# Patient Record
Sex: FEMALE | Race: WHITE | NOT HISPANIC OR LATINO
[De-identification: names, ages, dates, MRNs, and addresses within clinical notes are randomized per-mention and may not be internally consistent; named-entity substitution may affect disease eponyms.]

---

## 2017-03-22 ENCOUNTER — APPOINTMENT (OUTPATIENT)
Dept: FAMILY MEDICINE | Facility: CLINIC | Age: 67
End: 2017-03-22

## 2017-03-22 VITALS
HEIGHT: 62 IN | OXYGEN SATURATION: 97 % | RESPIRATION RATE: 16 BRPM | SYSTOLIC BLOOD PRESSURE: 104 MMHG | HEART RATE: 76 BPM | BODY MASS INDEX: 29.27 KG/M2 | WEIGHT: 159.06 LBS | DIASTOLIC BLOOD PRESSURE: 62 MMHG

## 2017-03-23 LAB
ALBUMIN SERPL ELPH-MCNC: 4.3 G/DL
ALP BLD-CCNC: 120 U/L
ALT SERPL-CCNC: 17 U/L
ANION GAP SERPL CALC-SCNC: 14 MMOL/L
AST SERPL-CCNC: 25 U/L
BILIRUB SERPL-MCNC: 0.2 MG/DL
BUN SERPL-MCNC: 14 MG/DL
CALCIUM SERPL-MCNC: 9.8 MG/DL
CHLORIDE SERPL-SCNC: 99 MMOL/L
CHOLEST SERPL-MCNC: 240 MG/DL
CHOLEST/HDLC SERPL: 2.6 RATIO
CO2 SERPL-SCNC: 27 MMOL/L
CREAT SERPL-MCNC: 0.83 MG/DL
GLUCOSE SERPL-MCNC: 82 MG/DL
HBA1C MFR BLD HPLC: 5.8 %
HDLC SERPL-MCNC: 91 MG/DL
LDLC SERPL CALC-MCNC: 121 MG/DL
POTASSIUM SERPL-SCNC: 4.1 MMOL/L
PROT SERPL-MCNC: 8 G/DL
SODIUM SERPL-SCNC: 140 MMOL/L
TRIGL SERPL-MCNC: 142 MG/DL
TSH SERPL-ACNC: 1.19 UIU/ML

## 2017-08-07 ENCOUNTER — RX RENEWAL (OUTPATIENT)
Age: 67
End: 2017-08-07

## 2018-04-23 ENCOUNTER — FORM ENCOUNTER (OUTPATIENT)
Age: 68
End: 2018-04-23

## 2018-04-23 ENCOUNTER — APPOINTMENT (OUTPATIENT)
Dept: FAMILY MEDICINE | Facility: CLINIC | Age: 68
End: 2018-04-23
Payer: COMMERCIAL

## 2018-04-23 VITALS
BODY MASS INDEX: 27.23 KG/M2 | DIASTOLIC BLOOD PRESSURE: 62 MMHG | HEIGHT: 62 IN | WEIGHT: 148 LBS | SYSTOLIC BLOOD PRESSURE: 136 MMHG

## 2018-04-23 VITALS — SYSTOLIC BLOOD PRESSURE: 120 MMHG | DIASTOLIC BLOOD PRESSURE: 76 MMHG

## 2018-04-23 DIAGNOSIS — M11.259: ICD-10-CM

## 2018-04-23 DIAGNOSIS — Z01.818 ENCOUNTER FOR OTHER PREPROCEDURAL EXAMINATION: ICD-10-CM

## 2018-04-23 PROCEDURE — 36415 COLL VENOUS BLD VENIPUNCTURE: CPT

## 2018-04-23 PROCEDURE — 99397 PER PM REEVAL EST PAT 65+ YR: CPT | Mod: 25

## 2018-04-23 PROCEDURE — G0009: CPT

## 2018-04-23 PROCEDURE — 90732 PPSV23 VACC 2 YRS+ SUBQ/IM: CPT

## 2018-04-23 RX ORDER — TOBRAMYCIN AND DEXAMETHASONE 3; 1 MG/ML; MG/ML
0.3-0.1 SUSPENSION/ DROPS OPHTHALMIC
Qty: 5 | Refills: 0 | Status: DISCONTINUED | COMMUNITY
Start: 2017-03-15 | End: 2018-04-23

## 2018-04-24 ENCOUNTER — APPOINTMENT (OUTPATIENT)
Dept: RADIOLOGY | Facility: CLINIC | Age: 68
End: 2018-04-24
Payer: COMMERCIAL

## 2018-04-24 ENCOUNTER — OUTPATIENT (OUTPATIENT)
Dept: OUTPATIENT SERVICES | Facility: HOSPITAL | Age: 68
LOS: 1 days | End: 2018-04-24
Payer: COMMERCIAL

## 2018-04-24 DIAGNOSIS — Z00.8 ENCOUNTER FOR OTHER GENERAL EXAMINATION: ICD-10-CM

## 2018-04-24 LAB
ALBUMIN SERPL ELPH-MCNC: 4.3 G/DL
ALP BLD-CCNC: 100 U/L
ALT SERPL-CCNC: 17 U/L
ANION GAP SERPL CALC-SCNC: 12 MMOL/L
AST SERPL-CCNC: 24 U/L
BILIRUB SERPL-MCNC: 0.4 MG/DL
BUN SERPL-MCNC: 14 MG/DL
CALCIUM SERPL-MCNC: 10.4 MG/DL
CHLORIDE SERPL-SCNC: 100 MMOL/L
CHOLEST SERPL-MCNC: 212 MG/DL
CHOLEST/HDLC SERPL: 2.5 RATIO
CO2 SERPL-SCNC: 29 MMOL/L
CREAT SERPL-MCNC: 0.72 MG/DL
GLUCOSE SERPL-MCNC: 83 MG/DL
HBA1C MFR BLD HPLC: 5.6 %
HDLC SERPL-MCNC: 86 MG/DL
LDLC SERPL CALC-MCNC: 111 MG/DL
POTASSIUM SERPL-SCNC: 5 MMOL/L
PROT SERPL-MCNC: 7.8 G/DL
SODIUM SERPL-SCNC: 141 MMOL/L
TRIGL SERPL-MCNC: 73 MG/DL
TSH SERPL-ACNC: 0.98 UIU/ML

## 2018-04-24 PROCEDURE — 72110 X-RAY EXAM L-2 SPINE 4/>VWS: CPT | Mod: 26

## 2018-04-24 PROCEDURE — 72110 X-RAY EXAM L-2 SPINE 4/>VWS: CPT

## 2018-04-24 PROCEDURE — 72170 X-RAY EXAM OF PELVIS: CPT

## 2018-04-24 PROCEDURE — 73522 X-RAY EXAM HIPS BI 3-4 VIEWS: CPT

## 2018-04-24 PROCEDURE — 73522 X-RAY EXAM HIPS BI 3-4 VIEWS: CPT | Mod: 26

## 2018-04-26 PROBLEM — M11.259: Status: ACTIVE | Noted: 2018-04-26

## 2018-10-25 ENCOUNTER — APPOINTMENT (OUTPATIENT)
Dept: FAMILY MEDICINE | Facility: CLINIC | Age: 68
End: 2018-10-25
Payer: COMMERCIAL

## 2018-10-25 VITALS
OXYGEN SATURATION: 95 % | WEIGHT: 152.25 LBS | HEART RATE: 72 BPM | HEIGHT: 62 IN | DIASTOLIC BLOOD PRESSURE: 70 MMHG | SYSTOLIC BLOOD PRESSURE: 122 MMHG | BODY MASS INDEX: 28.02 KG/M2

## 2018-10-25 PROCEDURE — 99214 OFFICE O/P EST MOD 30 MIN: CPT | Mod: 25

## 2018-10-25 PROCEDURE — G0008: CPT

## 2018-10-25 PROCEDURE — 90662 IIV NO PRSV INCREASED AG IM: CPT

## 2018-10-25 NOTE — ASSESSMENT
[FreeTextEntry1] : Trial of Flexeril for torticollis.\par \par Significance of aortic insufficiency. Discussed followup with cardiology.\par \par Continue antidepressant medications. Patient rarely uses Xanax

## 2018-10-25 NOTE — PHYSICAL EXAM
[No Acute Distress] : no acute distress [Thyroid Normal, No Nodules] : the thyroid was normal and there were no nodules present [Clear to Auscultation] : lungs were clear to auscultation bilaterally [Regular Rhythm] : with a regular rhythm [No Murmur] : no murmur heard [No Carotid Bruits] : no carotid bruits [No Edema] : there was no peripheral edema [de-identified] : No murmur appreciated

## 2018-10-25 NOTE — HISTORY OF PRESENT ILLNESS
[de-identified] : Chronic torticollis. Patient unable to sleep in any position other than on her back and neck pain. No radiculopathy. Will give trial of Flexeril.\par \par Patient has daytime fatigue. She snores, but does not have apnea\par \par Patch of eczema, right antecubital fossa, response to steroid.\par \par Moderate aortic insufficiency. No shortness of breath is followed by cardiology. No chest pain or palpitations\par \par Depression well controlled on current meds

## 2018-10-25 NOTE — REVIEW OF SYSTEMS
[Fatigue] : fatigue [Recent Change In Weight] : ~T no recent weight change [Muscle Pain] : muscle pain [Negative] : Gastrointestinal

## 2019-03-28 ENCOUNTER — EMERGENCY (EMERGENCY)
Facility: HOSPITAL | Age: 69
LOS: 1 days | End: 2019-03-28
Admitting: EMERGENCY MEDICINE
Payer: COMMERCIAL

## 2019-03-28 PROCEDURE — 99283 EMERGENCY DEPT VISIT LOW MDM: CPT

## 2019-03-29 ENCOUNTER — APPOINTMENT (OUTPATIENT)
Dept: FAMILY MEDICINE | Facility: CLINIC | Age: 69
End: 2019-03-29
Payer: COMMERCIAL

## 2019-03-29 VITALS
HEIGHT: 62 IN | DIASTOLIC BLOOD PRESSURE: 72 MMHG | SYSTOLIC BLOOD PRESSURE: 120 MMHG | HEART RATE: 80 BPM | BODY MASS INDEX: 27.97 KG/M2 | WEIGHT: 152 LBS | OXYGEN SATURATION: 97 % | RESPIRATION RATE: 16 BRPM

## 2019-03-29 DIAGNOSIS — M43.6 TORTICOLLIS: ICD-10-CM

## 2019-03-29 PROCEDURE — 99213 OFFICE O/P EST LOW 20 MIN: CPT

## 2019-03-29 NOTE — PLAN
[FreeTextEntry1] : Severe neck pain and radiculopathy, secondary to DJD. Will renew Valium 5 mg patient has appointment with neurosurgery in 3 days obtain a repeat MRI

## 2019-03-29 NOTE — HISTORY OF PRESENT ILLNESS
[FreeTextEntry8] : Severe neck pain with extremely limited range of motion and muscle spasm with some left-sided radiculopathy. No bowel or bladder symptoms. No spasticity of gait seen at a walk-in 2 days ago given Toradol Motrin, Valium and oxycodone, which only provides temporary relief. MRI 4 years ago, showed significant cervical radiculopathy. Patient has appointment with Dr. Gabriel in 3 days.\par \par Physical exam unable to move the neck in any direction. Deep tendon reflexes are 3+ and symmetrical. Upper and lower extremities

## 2019-03-31 ENCOUNTER — RX RENEWAL (OUTPATIENT)
Age: 69
End: 2019-03-31

## 2019-04-01 ENCOUNTER — APPOINTMENT (OUTPATIENT)
Dept: NEUROSURGERY | Facility: CLINIC | Age: 69
End: 2019-04-01
Payer: COMMERCIAL

## 2019-04-01 VITALS
DIASTOLIC BLOOD PRESSURE: 70 MMHG | SYSTOLIC BLOOD PRESSURE: 118 MMHG | HEIGHT: 62 IN | BODY MASS INDEX: 27.6 KG/M2 | WEIGHT: 150 LBS

## 2019-04-01 PROCEDURE — 99204 OFFICE O/P NEW MOD 45 MIN: CPT

## 2019-04-01 NOTE — RESULTS/DATA
[FreeTextEntry1] : MRI ([facility]) was personally reviewed with the patient today. It reveals a slight retrolisthesis at C5-6. There is a broad based disc osteophyte complex at C5-6 resulting in mild to moderate central canal and moderate bilateral foraminal stenosis. At C4-5 there is a broad based disc osteophyte complex without central canal or bilateral foraminal stenosis. At C6-7 there is another broad based disc osteophyte complex without central canal narrowing. \par \par full report within EMR

## 2019-04-01 NOTE — HISTORY OF PRESENT ILLNESS
[> 3 months] : more  than 3 months [FreeTextEntry1] : Neck pain [de-identified] : Mrs. Mcleod is a pleasant 68 year old female who presents to the office in follow-up from a visit to the ED at [facility] [place] on 3/28 for severe neck pain. She has been experiencing neck pain for the past twenty years, but will flare up on occasions. She awoke on Wednesday morning in severe pain. She has been seeing pain management, she last saw someone about 10 years ago for her neck pain. She had trigger point injections in the past with little to no relief. She had also gone to PT multiple times and acupuncture with minimal temporary relief. She will typically take Advil as needed for severe pain with some relief. Her pain has gotten so bad that she presented to the ED on Thursday last week  and was given an injection of Toradol, Valium, Percocet and Ibuprofen. She has been taking that with some relief. She also notes reduced range of motion in her neck. She rates her pain today as 10/10. She denies radicular symptoms, paresthesias and weakness in the arms, but has had that in the past. She is right hand dominant and denies loss of dexterity and  strength.

## 2019-04-01 NOTE — PHYSICAL EXAM
[General Appearance - Alert] : alert [General Appearance - In No Acute Distress] : in no acute distress [General Appearance - Well Nourished] : well nourished [General Appearance - Well Developed] : well developed [General Appearance - Well-Appearing] : healthy appearing [] : normal voice and communication [Person] : oriented to person [Place] : oriented to place [Time] : oriented to time [Cranial Nerves Optic (II)] : visual acuity intact bilaterally,  pupils equal round and reactive to light [Cranial Nerves Oculomotor (III)] : extraocular motion intact [Cranial Nerves Trigeminal (V)] : facial sensation intact symmetrically [Cranial Nerves Facial (VII)] : face symmetrical [Cranial Nerves Vestibulocochlear (VIII)] : hearing was intact bilaterally [Cranial Nerves Glossopharyngeal (IX)] : tongue and palate midline [Cranial Nerves Accessory (XI - Cranial And Spinal)] : head turning and shoulder shrug symmetric [Cranial Nerves Hypoglossal (XII)] : there was no tongue deviation with protrusion [Motor Tone] : muscle tone was normal in all four extremities [Motor Strength] : muscle strength was normal in all four extremities [Involuntary Movements] : no involuntary movements were seen [No Muscle Atrophy] : normal bulk in all four extremities [Motor Handedness Right-Handed] : the patient is right hand dominant [Sensation Tactile Decrease] : light touch was intact [Sensation Vibration Decrease] : vibration was intact [Proprioception] : proprioception was intact [Abnormal Walk] : normal gait [Balance] : balance was intact [2+] : Brachioradialis left 2+ [No Visual Abnormalities] : no visible abnormalities [Normal Lordosis] : normal lordosis [No Scoliosis] : no scoliosis [No Masses] : no masses [Left  Paraspinal ___ (level)] : ~Ulevel [unfilled] left paraspinal [Right Paraspinal ___ (level)] : ~Ulevel [unfilled] right paraspinal [Muscle Spasms, Bilateral] : bilateral muscle spasms [Full Except as Noted] : Full except as noted: [Restricted] : was restricted [Pain] : was painful [Normal] : normal [4] : 4/5 Wrist Extension (C6) [Intact] : all reflexes within normal limits bilaterally [FreeTextEntry1] : appears uncomfortable [FreeTextEntry6] : +trace weakness with biceps on the left [Spurling's - Opposite Side] : Negative Spurling's on opposite side [Spurling's Same Side] : Negative Spurling's on same side

## 2019-04-01 NOTE — PLAN
[FreeTextEntry1] : DIAGNOSIS:  CERVICAL HERNIATED DISC C5-6\par TREATMENT PLAN: ANTERIOR CERVICAL  DECOMPRESSION AND FUSION vs. NON-SURGICAL TREATMENT\par \par This is a patient who presents to the office with neck pain, and what appears to be mild central canal stenosis at C5-6. Her MRI reveals a disc osteophyte complex resulting in mild to moderate central canal stenosis and moderate bilateral foraminal stenosis. She is currently only complaining of neck pain. She denies any other radicular symptoms, and does not display any symptoms of myelopathy. I recommended she start with the most conservative treatment which includes physical therapy and antiinflammatories. If his symptoms are refractory to the conservative management then I recommended an anterior cervical decompression and fusion as a treatment option.  This entails removing the disk, osteophytes and the ventral uncovertebral joints along with the underlying hypertrophied/ossified posterior longitudinal ligamentum.  This will allow for a widening of the spinal canal and the neuroforamen at the effected levels.  In doing so this will likely result in added instability to the spine at this level requiring the need for instrumented stabilization and fusion.  This implies the use of anterior plate fixation and interbody prosthetics to provide strength and anatomical alignment to the operated segments.  \par \par Risks and benefits of surgery were described to the patient in detail which include but not excluding those otherwise not mentioned:  coma paralysis, death, stroke, spinal fluid leak, nerve injury, weakness, infection, hardware malfunction/failure/malpositioning requiring re-operation, vascular injury, nonunion/pseudoarthrosis, adjacent segment degeneration, dysphonia/dysphagia and persistent pain.\par

## 2019-04-01 NOTE — ASSESSMENT
[FreeTextEntry1] : This is a pleasant 68 year old female who presents to the office today with complaints of axial neck pain. She is currently neurologically intact on exam and does not have any radicular symptoms or signs of myelopathy. Her MRI does reveal a disc osteophyte complex at C5-6 resulting in foraminal and central canal stenosis, however I think we can manage her symptoms conservatively at this time. I've recommended she consider pain management for epidural steroid injections prior to any surgical intervention. I've advised her that if her symptoms do not improve or she does develop radicular symptoms then she may be a candidate for a one level ACDF. We can follow-up with her once she follow-up with pain management.

## 2019-04-01 NOTE — REASON FOR VISIT
[Referred By: _________] : Patient was referred by STEPHANE [FreeTextEntry1] : [facility] ed f/u, neck pain, mri at [facility]

## 2019-04-01 NOTE — CONSULT LETTER
[Dear  ___] : Dear  [unfilled], [Consult Letter:] : I had the pleasure of evaluating your patient, [unfilled]. [Consult Closing:] : Thank you very much for allowing me to participate in the care of this patient.  If you have any questions, please do not hesitate to contact me. [Sincerely,] : Sincerely, [FreeTextEntry1] : Mrs. Mcleod is a pleasant 68 year old female who presents to the office in follow-up from a visit to the ED at [facility] on 3/28 for severe neck pain. She has been experiencing neck pain for the past twenty years, but will flare up on occasions. She awoke on Wednesday morning in severe pain. She has been seeing pain management, she last saw someone about 10 years ago for her neck pain. She had trigger point injections in the past with little to no relief. She had also gone to PT multiple times and acupuncture with minimal temporary relief. She will typically take Advil as needed for severe pain with some relief. Her pain has gotten so bad that she presented to the ED on Thursday last week  and was given an injection of Toradol, Valium, Percocet and Ibuprofen. She has been taking that with some relief. She also notes reduced range of motion in her neck. She rates her pain today as 10/10. She denies radicular symptoms, paresthesias and weakness in the arms, but has had that in the past. She is right hand dominant and denies loss of dexterity and  strength.\par \par \par This is a pleasant 68 year old female who presents to the office today with complaints of axial neck pain. She is currently neurologically intact on exam and does not have any radicular symptoms or signs of myelopathy. Her MRI does reveal a disc osteophyte complex at C5-6 resulting in foraminal and central canal stenosis, however I think we can manage her symptoms conservatively at this time. I've recommended she consider pain management for epidural steroid injections prior to any surgical intervention. I've advised her that if her symptoms do not improve or she does develop radicular symptoms then she may be a candidate for a one level ACDF. We can follow-up with her once she follow-up with pain management. \par \par DIAGNOSIS:  CERVICAL HERNIATED DISC C5-6\par TREATMENT PLAN: ANTERIOR CERVICAL  DECOMPRESSION AND FUSION vs. NON-SURGICAL TREATMENT\par \par This is a patient who presents to the office with neck pain, and what appears to be mild central canal stenosis at C5-6. Her MRI reveals a disc osteophyte complex resulting in mild to moderate central canal stenosis and moderate bilateral foraminal stenosis. She is currently only complaining of neck pain. She denies any other radicular symptoms, and does not display any symptoms of myelopathy. I recommended she start with the most conservative treatment which includes physical therapy and antiinflammatories. If his symptoms are refractory to the conservative management then I recommended an anterior cervical decompression and fusion as a treatment option.  This entails removing the disk, osteophytes and the ventral uncovertebral joints along with the underlying hypertrophied/ossified posterior longitudinal ligamentum.  This will allow for a widening of the spinal canal and the neuroforamen at the effected levels.  In doing so this will likely result in added instability to the spine at this level requiring the need for instrumented stabilization and fusion.  This implies the use of anterior plate fixation and interbody prosthetics to provide strength and anatomical alignment to the operated segments.  \par \par  [FreeTextEntry3] : Krystal Serra R-PAC

## 2019-04-03 ENCOUNTER — APPOINTMENT (OUTPATIENT)
Dept: FAMILY MEDICINE | Facility: CLINIC | Age: 69
End: 2019-04-03

## 2019-04-10 ENCOUNTER — OUTPATIENT (OUTPATIENT)
Dept: OUTPATIENT SERVICES | Facility: HOSPITAL | Age: 69
LOS: 1 days | End: 2019-04-10

## 2019-04-24 ENCOUNTER — OUTPATIENT (OUTPATIENT)
Dept: OUTPATIENT SERVICES | Facility: HOSPITAL | Age: 69
LOS: 1 days | End: 2019-04-24

## 2019-05-08 ENCOUNTER — APPOINTMENT (OUTPATIENT)
Dept: FAMILY MEDICINE | Facility: CLINIC | Age: 69
End: 2019-05-08
Payer: COMMERCIAL

## 2019-05-08 ENCOUNTER — TRANSCRIPTION ENCOUNTER (OUTPATIENT)
Age: 69
End: 2019-05-08

## 2019-05-08 VITALS
DIASTOLIC BLOOD PRESSURE: 70 MMHG | OXYGEN SATURATION: 97 % | WEIGHT: 151.25 LBS | HEIGHT: 62 IN | BODY MASS INDEX: 27.83 KG/M2 | SYSTOLIC BLOOD PRESSURE: 116 MMHG | HEART RATE: 82 BPM

## 2019-05-08 PROCEDURE — 99214 OFFICE O/P EST MOD 30 MIN: CPT

## 2019-05-08 RX ORDER — OXYCODONE AND ACETAMINOPHEN 5; 325 MG/1; MG/1
5-325 TABLET ORAL
Qty: 28 | Refills: 0 | Status: DISCONTINUED | COMMUNITY
Start: 2019-04-01 | End: 2019-05-08

## 2019-05-08 NOTE — HISTORY OF PRESENT ILLNESS
[de-identified] : Neck pain improved after facet blocks C2 to through C4.  Patient now scheduled for ablation\par \par Depression and anxiety well controlled on Paxil\par \par Followed by cardiology moderate aortic insufficiency and mild mitral regurgitation on recent echo cardiologist is Dr. Jeronimo cardiology request lipid profile patient's calculated risk is 5% in the next 10 years\par \par Due for colonoscopy\par \par Nontoxic multinodular goiter followed by endocrinology

## 2019-05-08 NOTE — HEALTH RISK ASSESSMENT
[] : No [No falls in past year] : Patient reported no falls in the past year [0] : 2) Feeling down, depressed, or hopeless: Not at all (0) [de-identified] : Healthy

## 2019-05-08 NOTE — PHYSICAL EXAM
[Clear to Auscultation] : lungs were clear to auscultation bilaterally [No Acute Distress] : no acute distress [Regular Rhythm] : with a regular rhythm [No Edema] : there was no peripheral edema [de-identified] : Decreased range of motion

## 2019-05-09 LAB
25(OH)D3 SERPL-MCNC: 50.4 NG/ML
ALBUMIN SERPL ELPH-MCNC: 4.4 G/DL
ALP BLD-CCNC: 117 U/L
ALT SERPL-CCNC: 22 U/L
ANION GAP SERPL CALC-SCNC: 11 MMOL/L
AST SERPL-CCNC: 23 U/L
BASOPHILS # BLD AUTO: 0.06 K/UL
BASOPHILS NFR BLD AUTO: 1 %
BILIRUB SERPL-MCNC: 0.3 MG/DL
BUN SERPL-MCNC: 14 MG/DL
CALCIUM SERPL-MCNC: 10 MG/DL
CHLORIDE SERPL-SCNC: 102 MMOL/L
CHOLEST SERPL-MCNC: 223 MG/DL
CHOLEST/HDLC SERPL: 2.8 RATIO
CO2 SERPL-SCNC: 28 MMOL/L
CREAT SERPL-MCNC: 0.71 MG/DL
EOSINOPHIL # BLD AUTO: 0.09 K/UL
EOSINOPHIL NFR BLD AUTO: 1.5 %
ESTIMATED AVERAGE GLUCOSE: 117 MG/DL
GLUCOSE SERPL-MCNC: 91 MG/DL
HBA1C MFR BLD HPLC: 5.7 %
HCT VFR BLD CALC: 42.8 %
HDLC SERPL-MCNC: 80 MG/DL
HGB BLD-MCNC: 13.5 G/DL
IMM GRANULOCYTES NFR BLD AUTO: 0.2 %
LDLC SERPL CALC-MCNC: 128 MG/DL
LYMPHOCYTES # BLD AUTO: 1.23 K/UL
LYMPHOCYTES NFR BLD AUTO: 20.1 %
MAN DIFF?: NORMAL
MCHC RBC-ENTMCNC: 30.4 PG
MCHC RBC-ENTMCNC: 31.5 GM/DL
MCV RBC AUTO: 96.4 FL
MONOCYTES # BLD AUTO: 0.35 K/UL
MONOCYTES NFR BLD AUTO: 5.7 %
NEUTROPHILS # BLD AUTO: 4.37 K/UL
NEUTROPHILS NFR BLD AUTO: 71.5 %
PLATELET # BLD AUTO: 288 K/UL
POTASSIUM SERPL-SCNC: 5.2 MMOL/L
PROT SERPL-MCNC: 7 G/DL
RBC # BLD: 4.44 M/UL
RBC # FLD: 13.2 %
SODIUM SERPL-SCNC: 141 MMOL/L
TRIGL SERPL-MCNC: 76 MG/DL
TSH SERPL-ACNC: 1.04 UIU/ML
WBC # FLD AUTO: 6.11 K/UL

## 2019-05-22 ENCOUNTER — OUTPATIENT (OUTPATIENT)
Dept: OUTPATIENT SERVICES | Facility: HOSPITAL | Age: 69
LOS: 1 days | End: 2019-05-22

## 2019-09-04 ENCOUNTER — OUTPATIENT (OUTPATIENT)
Dept: OUTPATIENT SERVICES | Facility: HOSPITAL | Age: 69
LOS: 1 days | End: 2019-09-04

## 2019-11-21 ENCOUNTER — APPOINTMENT (OUTPATIENT)
Dept: FAMILY MEDICINE | Facility: CLINIC | Age: 69
End: 2019-11-21
Payer: COMMERCIAL

## 2019-11-21 VITALS
WEIGHT: 161 LBS | OXYGEN SATURATION: 97 % | HEIGHT: 62 IN | DIASTOLIC BLOOD PRESSURE: 70 MMHG | SYSTOLIC BLOOD PRESSURE: 110 MMHG | HEART RATE: 92 BPM | BODY MASS INDEX: 29.63 KG/M2

## 2019-11-21 DIAGNOSIS — D17.79 BENIGN LIPOMATOUS NEOPLASM OF OTHER SITES: ICD-10-CM

## 2019-11-21 DIAGNOSIS — M50.20 OTHER CERVICAL DISC DISPLACEMENT, UNSPECIFIED CERVICAL REGION: ICD-10-CM

## 2019-11-21 DIAGNOSIS — E04.2 NONTOXIC MULTINODULAR GOITER: ICD-10-CM

## 2019-11-21 DIAGNOSIS — M54.32 SCIATICA, LEFT SIDE: ICD-10-CM

## 2019-11-21 DIAGNOSIS — M19.012 PRIMARY OSTEOARTHRITIS, LEFT SHOULDER: ICD-10-CM

## 2019-11-21 PROCEDURE — 20605 DRAIN/INJ JOINT/BURSA W/O US: CPT

## 2019-11-21 PROCEDURE — 99214 OFFICE O/P EST MOD 30 MIN: CPT | Mod: 25

## 2019-11-21 RX ORDER — MELOXICAM 15 MG/1
15 TABLET ORAL
Qty: 30 | Refills: 3 | Status: DISCONTINUED | COMMUNITY
Start: 2018-04-26 | End: 2019-11-21

## 2019-11-21 RX ORDER — DIAZEPAM 5 MG/1
5 TABLET ORAL
Qty: 30 | Refills: 0 | Status: DISCONTINUED | COMMUNITY
Start: 2019-03-29 | End: 2019-11-21

## 2019-11-21 NOTE — HISTORY OF PRESENT ILLNESS
[de-identified] : Depression and anxiety well controlled on paroxetine\par \par Underwent ablation of recurrent cervical nerves with partial relief.  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

## 2019-11-21 NOTE — PHYSICAL EXAM
[Thyroid Normal, No Nodules] : the thyroid was normal and there were no nodules present [Normal] : no carotid or abdominal bruits heard, no varicosities, pedal pulses are present, no peripheral edema, no extremity clubbing or cyanosis and no palpable aorta

## 2019-11-21 NOTE — ASSESSMENT
[FreeTextEntry1] : Follow-up with orthopedics Home shoulder exercises discussed\par \par Slightly elevated hemoglobin A1c check labs

## 2019-11-24 LAB
ESTIMATED AVERAGE GLUCOSE: 114 MG/DL
HBA1C MFR BLD HPLC: 5.6 %

## 2019-12-27 ENCOUNTER — APPOINTMENT (OUTPATIENT)
Dept: FAMILY MEDICINE | Facility: CLINIC | Age: 69
End: 2019-12-27

## 2020-02-27 ENCOUNTER — RX RENEWAL (OUTPATIENT)
Age: 70
End: 2020-02-27

## 2020-05-15 ENCOUNTER — APPOINTMENT (OUTPATIENT)
Dept: SURGERY | Facility: CLINIC | Age: 70
End: 2020-05-15

## 2020-05-18 ENCOUNTER — RX RENEWAL (OUTPATIENT)
Age: 70
End: 2020-05-18

## 2020-07-20 ENCOUNTER — APPOINTMENT (OUTPATIENT)
Dept: SURGERY | Facility: CLINIC | Age: 70
End: 2020-07-20
Payer: COMMERCIAL

## 2020-07-20 VITALS
HEART RATE: 71 BPM | TEMPERATURE: 97.7 F | DIASTOLIC BLOOD PRESSURE: 74 MMHG | BODY MASS INDEX: 27.97 KG/M2 | OXYGEN SATURATION: 97 % | HEIGHT: 62 IN | WEIGHT: 152 LBS | SYSTOLIC BLOOD PRESSURE: 111 MMHG

## 2020-07-20 DIAGNOSIS — D17.21 BENIGN LIPOMATOUS NEOPLASM OF SKIN AND SUBCUTANEOUS TISSUE OF RIGHT ARM: ICD-10-CM

## 2020-07-20 PROCEDURE — 99244 OFF/OP CNSLTJ NEW/EST MOD 40: CPT

## 2020-07-20 NOTE — CONSULT LETTER
[Dear  ___] : Dear  [unfilled], [Please see my note below.] : Please see my note below. [Consult Letter:] : I had the pleasure of evaluating your patient, [unfilled]. [Consult Closing:] : Thank you very much for allowing me to participate in the care of this patient.  If you have any questions, please do not hesitate to contact me. [Sincerely,] : Sincerely, [FreeTextEntry3] : Michael Rendon MD, FACS\par  of Surgery\par [facility]\par

## 2020-07-20 NOTE — PHYSICAL EXAM
[No Rash or Lesion] : No rash or lesion [Alert] : alert [Oriented to Person] : oriented to person [Oriented to Place] : oriented to place [Oriented to Time] : oriented to time [JVD] : no jugular venous distention  [Purpura] : no purpura  [Petechiae] : no petechiae [Skin Ulcer] : no ulcer [Skin Induration] : no induration [de-identified] : non toxic, in no acute distress  [de-identified] : NC/AT PERRL EOMI no scleral icterus  [de-identified] : trachea midline, no gross mass  [de-identified] : no audible wheezing or stridor  [de-identified] : soft, nondistended  [de-identified] : FROM of all extremities with no angulation or deformity [de-identified] : there is a 1.5 by 2 cm soft rubbery semi-mobile mass of the right upper extremity consistent with a recurrent lipoma [de-identified] : mood is calm

## 2020-07-20 NOTE — HISTORY OF PRESENT ILLNESS
[de-identified] : The patient comes to the office in consultation by Dr. Tej Nicholas for evaluation of a recurrent lump of the right upper arm.  The patient reports that she had a lipoma removed in a doctor's office many years ago and now it appears to be coming back and getting larger.  She has no pain, no discomfort, no drainage from the area.

## 2020-07-20 NOTE — ASSESSMENT
[FreeTextEntry1] : The patient is a 69 year old female with a recurrent right  upper arm lipoma.  The patient wishes to have it removed as it is getting larger and larger.  The risks, benefits, and alternatives including the option of doing nothing to excision of a recurrent right upper arm lipoma were discussed.  The potential complications including but not limited to infection, bleeding, wound dehiscence, and possible recurrence of the lipoma were discussed.  The patient understands and wishes to proceed at the next available time.\par \par \par \par  Mercedes Flap Text: The defect edges were debeveled with a #15 scalpel blade.  Given the location of the defect, shape of the defect and the proximity to free margins a Mercedes flap was deemed most appropriate.  Using a sterile surgical marker, an appropriate advancement flap was drawn incorporating the defect and placing the expected incisions within the relaxed skin tension lines where possible. The area thus outlined was incised deep to adipose tissue with a #15 scalpel blade.  The skin margins were undermined to an appropriate distance in all directions utilizing iris scissors.

## 2020-07-22 ENCOUNTER — FORM ENCOUNTER (OUTPATIENT)
Age: 70
End: 2020-07-22

## 2020-08-12 ENCOUNTER — APPOINTMENT (OUTPATIENT)
Dept: FAMILY MEDICINE | Facility: CLINIC | Age: 70
End: 2020-08-12

## 2020-10-11 ENCOUNTER — EMERGENCY (EMERGENCY)
Facility: HOSPITAL | Age: 70
LOS: 1 days | End: 2020-10-11
Admitting: EMERGENCY MEDICINE
Payer: COMMERCIAL

## 2020-10-11 PROCEDURE — 70450 CT HEAD/BRAIN W/O DYE: CPT | Mod: 26

## 2020-10-11 PROCEDURE — 99285 EMERGENCY DEPT VISIT HI MDM: CPT

## 2020-10-11 PROCEDURE — 93010 ELECTROCARDIOGRAM REPORT: CPT

## 2020-12-07 ENCOUNTER — APPOINTMENT (OUTPATIENT)
Dept: FAMILY MEDICINE | Facility: CLINIC | Age: 70
End: 2020-12-07
Payer: COMMERCIAL

## 2020-12-07 VITALS
TEMPERATURE: 97.2 F | HEIGHT: 62 IN | WEIGHT: 146 LBS | BODY MASS INDEX: 26.87 KG/M2 | SYSTOLIC BLOOD PRESSURE: 102 MMHG | HEART RATE: 76 BPM | DIASTOLIC BLOOD PRESSURE: 70 MMHG | OXYGEN SATURATION: 98 % | RESPIRATION RATE: 16 BRPM

## 2020-12-07 DIAGNOSIS — Z23 ENCOUNTER FOR IMMUNIZATION: ICD-10-CM

## 2020-12-07 DIAGNOSIS — M19.019 PRIMARY OSTEOARTHRITIS, UNSPECIFIED SHOULDER: ICD-10-CM

## 2020-12-07 PROCEDURE — G0008: CPT

## 2020-12-07 PROCEDURE — 99397 PER PM REEVAL EST PAT 65+ YR: CPT | Mod: 25

## 2020-12-07 PROCEDURE — 90662 IIV NO PRSV INCREASED AG IM: CPT

## 2020-12-07 PROCEDURE — 99072 ADDL SUPL MATRL&STAF TM PHE: CPT

## 2020-12-07 NOTE — PHYSICAL EXAM
[Normal] : soft, non-tender, non-distended, no masses palpated, no HSM and normal bowel sounds [de-identified] : No murmur appreciated [de-identified] : Good range of motion of shoulder

## 2020-12-07 NOTE — REVIEW OF SYSTEMS
[Negative] : Heme/Lymph [FreeTextEntry7] : Occasional fecal incontinence [FreeTextEntry9] : Shoulder pain

## 2020-12-07 NOTE — ASSESSMENT
[FreeTextEntry1] : Shoulder pain will follow-up with orthopedist if no incontinence seen gastroenterology\par \par Syncope no etiology found calcium score 2 complete work-up negative patient encouraged to drink fluids and salt for low blood pressure

## 2020-12-07 NOTE — HISTORY OF PRESENT ILLNESS
[FreeTextEntry1] : Yearly physical [de-identified] : Episode of syncope while making the bed several weeks ago complete cardiologist and neurologic work-up negative patient has had no further problems.  She does not exercise encouraged to do so CT cardiac angiogram January 2020 showed calcium score of 2\par \par Chronic left shoulder pain and neck pain requests orthopedic evaluation referred to Dr. Duggan on physical therapy discussed\par \par Occasional fecal incontinence is following up with gastroenterology has colonoscopy planned in January Salazar robbins discussed

## 2020-12-07 NOTE — HEALTH RISK ASSESSMENT
[Very Good] : ~his/her~  mood as very good [] : No [Yes] : Yes [No falls in past year] : Patient reported no falls in the past year [0] : 2) Feeling down, depressed, or hopeless: Not at all (0) [de-identified] : Occasionally [Patient reported colonoscopy was normal] : Patient reported colonoscopy was normal [Change in mental status noted] : No change in mental status noted [Fully functional (bathing, dressing, toileting, transferring, walking, feeding)] : Fully functional (bathing, dressing, toileting, transferring, walking, feeding) [Fully functional (using the telephone, shopping, preparing meals, housekeeping, doing laundry, using] : Fully functional and needs no help or supervision to perform IADLs (using the telephone, shopping, preparing meals, housekeeping, doing laundry, using transportation, managing medications and managing finances) [Seat Belt] :  uses seat belt [ColonoscopyDate] : 06/19 [ColonoscopyComments] : Polyps [de-identified] : Bleeding gums sees dentist every 3 months

## 2020-12-08 LAB
ALBUMIN SERPL ELPH-MCNC: 4.4 G/DL
ALP BLD-CCNC: 128 U/L
ALT SERPL-CCNC: 20 U/L
ANION GAP SERPL CALC-SCNC: 10 MMOL/L
AST SERPL-CCNC: 23 U/L
BASOPHILS # BLD AUTO: 0.05 K/UL
BASOPHILS NFR BLD AUTO: 0.9 %
BILIRUB SERPL-MCNC: 0.3 MG/DL
BUN SERPL-MCNC: 16 MG/DL
CALCIUM SERPL-MCNC: 10.5 MG/DL
CHLORIDE SERPL-SCNC: 102 MMOL/L
CHOLEST SERPL-MCNC: 226 MG/DL
CO2 SERPL-SCNC: 28 MMOL/L
CREAT SERPL-MCNC: 0.75 MG/DL
EOSINOPHIL # BLD AUTO: 0.06 K/UL
EOSINOPHIL NFR BLD AUTO: 1.1 %
ESTIMATED AVERAGE GLUCOSE: 117 MG/DL
GLUCOSE SERPL-MCNC: 87 MG/DL
HBA1C MFR BLD HPLC: 5.7 %
HCT VFR BLD CALC: 44.9 %
HDLC SERPL-MCNC: 87 MG/DL
HGB BLD-MCNC: 14.2 G/DL
IMM GRANULOCYTES NFR BLD AUTO: 0.5 %
LDLC SERPL CALC-MCNC: 124 MG/DL
LYMPHOCYTES # BLD AUTO: 1.11 K/UL
LYMPHOCYTES NFR BLD AUTO: 19.7 %
MAN DIFF?: NORMAL
MCHC RBC-ENTMCNC: 29.7 PG
MCHC RBC-ENTMCNC: 31.6 GM/DL
MCV RBC AUTO: 93.9 FL
MONOCYTES # BLD AUTO: 0.36 K/UL
MONOCYTES NFR BLD AUTO: 6.4 %
NEUTROPHILS # BLD AUTO: 4.02 K/UL
NEUTROPHILS NFR BLD AUTO: 71.4 %
NONHDLC SERPL-MCNC: 139 MG/DL
PLATELET # BLD AUTO: 305 K/UL
POTASSIUM SERPL-SCNC: 5.1 MMOL/L
PROT SERPL-MCNC: 7 G/DL
RBC # BLD: 4.78 M/UL
RBC # FLD: 13 %
SODIUM SERPL-SCNC: 141 MMOL/L
TRIGL SERPL-MCNC: 76 MG/DL
TSH SERPL-ACNC: 0.98 UIU/ML
WBC # FLD AUTO: 5.63 K/UL

## 2020-12-09 ENCOUNTER — NON-APPOINTMENT (OUTPATIENT)
Age: 70
End: 2020-12-09

## 2021-01-27 ENCOUNTER — APPOINTMENT (OUTPATIENT)
Dept: FAMILY MEDICINE | Facility: CLINIC | Age: 71
End: 2021-01-27
Payer: COMMERCIAL

## 2021-01-27 DIAGNOSIS — Z71.89 OTHER SPECIFIED COUNSELING: ICD-10-CM

## 2021-01-27 PROCEDURE — 99447 NTRPROF PH1/NTRNET/EHR 11-20: CPT

## 2021-08-12 ENCOUNTER — APPOINTMENT (OUTPATIENT)
Dept: OPHTHALMOLOGY | Facility: CLINIC | Age: 71
End: 2021-08-12
Payer: COMMERCIAL

## 2021-08-12 ENCOUNTER — NON-APPOINTMENT (OUTPATIENT)
Age: 71
End: 2021-08-12

## 2021-08-12 PROCEDURE — 92134 CPTRZ OPH DX IMG PST SGM RTA: CPT

## 2021-08-12 PROCEDURE — 92014 COMPRE OPH EXAM EST PT 1/>: CPT

## 2021-09-13 ENCOUNTER — APPOINTMENT (OUTPATIENT)
Dept: FAMILY MEDICINE | Facility: CLINIC | Age: 71
End: 2021-09-13
Payer: COMMERCIAL

## 2021-09-13 ENCOUNTER — TRANSCRIPTION ENCOUNTER (OUTPATIENT)
Age: 71
End: 2021-09-13

## 2021-09-13 VITALS
HEIGHT: 62 IN | DIASTOLIC BLOOD PRESSURE: 76 MMHG | WEIGHT: 149 LBS | TEMPERATURE: 97.1 F | BODY MASS INDEX: 27.42 KG/M2 | SYSTOLIC BLOOD PRESSURE: 110 MMHG | OXYGEN SATURATION: 97 % | HEART RATE: 71 BPM

## 2021-09-13 VITALS — HEIGHT: 62 IN | BODY MASS INDEX: 27.42 KG/M2 | WEIGHT: 149 LBS

## 2021-09-13 DIAGNOSIS — M54.5 LOW BACK PAIN: ICD-10-CM

## 2021-09-13 PROCEDURE — 99214 OFFICE O/P EST MOD 30 MIN: CPT

## 2021-09-13 NOTE — HISTORY OF PRESENT ILLNESS
[FreeTextEntry8] : 2 weeks of left-sided sacroiliac area pain.  Relieved with lying down.  No sciatica.  Had one episode of fecal incontinence associated with diarrhea at the onset no recurrence no urinary difficulties patient had CAT scan last year showed spondylolisthesis.  Has had rectal surgery in the past she saw her gastroenterologist since this problem began and was reassured\par \par Physical exam no evidence for shingles tenderness over sacroiliac area straight leg raise negative\par \par Low back pain Celebrex and Tylenol physical therapy pain management stretching discussed

## 2021-12-17 ENCOUNTER — APPOINTMENT (OUTPATIENT)
Dept: FAMILY MEDICINE | Facility: CLINIC | Age: 71
End: 2021-12-17
Payer: COMMERCIAL

## 2021-12-17 VITALS
HEART RATE: 80 BPM | SYSTOLIC BLOOD PRESSURE: 120 MMHG | WEIGHT: 147 LBS | BODY MASS INDEX: 27.05 KG/M2 | DIASTOLIC BLOOD PRESSURE: 72 MMHG | OXYGEN SATURATION: 97 % | HEIGHT: 62 IN | TEMPERATURE: 97.2 F

## 2021-12-17 DIAGNOSIS — L71.0 PERIORAL DERMATITIS: ICD-10-CM

## 2021-12-17 DIAGNOSIS — I35.1 NONRHEUMATIC AORTIC (VALVE) INSUFFICIENCY: ICD-10-CM

## 2021-12-17 DIAGNOSIS — L30.9 DERMATITIS, UNSPECIFIED: ICD-10-CM

## 2021-12-17 PROCEDURE — 99397 PER PM REEVAL EST PAT 65+ YR: CPT | Mod: 25

## 2021-12-17 PROCEDURE — 90662 IIV NO PRSV INCREASED AG IM: CPT

## 2021-12-17 PROCEDURE — G0008: CPT

## 2021-12-17 RX ORDER — BETAMETHASONE DIPROPIONATE 0.5 MG/G
0.05 CREAM, AUGMENTED TOPICAL TWICE DAILY
Qty: 1 | Refills: 2 | Status: DISCONTINUED | COMMUNITY
Start: 2017-03-22 | End: 2021-12-17

## 2021-12-17 RX ORDER — CYCLOBENZAPRINE HYDROCHLORIDE 5 MG/1
5 TABLET, FILM COATED ORAL
Qty: 30 | Refills: 3 | Status: DISCONTINUED | COMMUNITY
Start: 2018-10-25 | End: 2021-12-17

## 2021-12-17 RX ORDER — ASPIRIN 81 MG
81 TABLET, DELAYED RELEASE (ENTERIC COATED) ORAL
Refills: 0 | Status: DISCONTINUED | COMMUNITY
End: 2021-12-17

## 2021-12-17 RX ORDER — CELECOXIB 200 MG/1
200 CAPSULE ORAL
Qty: 30 | Refills: 3 | Status: DISCONTINUED | COMMUNITY
Start: 2021-09-13 | End: 2021-12-17

## 2021-12-17 NOTE — HEALTH RISK ASSESSMENT
[Excellent] : ~his/her~  mood as  excellent [Never] : Never [Yes] : Yes [No falls in past year] : Patient reported no falls in the past year [PHQ-2 Negative - No further assessment needed] : PHQ-2 Negative - No further assessment needed [de-identified] : Occasionally [Patient reported mammogram was normal] : Patient reported mammogram was normal [MammogramDate] : 05/19 [ColonoscopyDate] : 12/19 [ColonoscopyComments] : Polyps

## 2021-12-17 NOTE — PHYSICAL EXAM
[No Edema] : there was no peripheral edema [Normal] : affect was normal and insight and judgment were intact [de-identified] : Slight erythema of chin

## 2021-12-17 NOTE — HISTORY OF PRESENT ILLNESS
[FreeTextEntry1] : Yearly physical [de-identified] : Patient has no complaints.  Her depression anxiety is well controlled on Paxil\par \par No further complaints of sciatica\par \par Will see gynecologist for mammogram.  Up-to-date on colonoscopies\par \par Mild perioral dermatitis responds to cortisone cream also has eczema of her hands given triamcinolone for her face and betamethasone for her hands knows how to use them.  Has follow-up with dermatology needed Mohs surgery on her leg

## 2021-12-17 NOTE — PLAN
[FreeTextEntry1] : Patient doing very well continue current meds follow-up with GYN\par \par Follow-up with dermatology cream was given for eczema and perioral dermatitis\par \par Anxiety and depression controlled with current meds

## 2021-12-18 LAB
ALBUMIN SERPL ELPH-MCNC: 4.5 G/DL
ALP BLD-CCNC: 132 U/L
ALT SERPL-CCNC: 20 U/L
ANION GAP SERPL CALC-SCNC: 12 MMOL/L
AST SERPL-CCNC: 22 U/L
BILIRUB SERPL-MCNC: 0.2 MG/DL
BUN SERPL-MCNC: 14 MG/DL
CALCIUM SERPL-MCNC: 10 MG/DL
CHLORIDE SERPL-SCNC: 101 MMOL/L
CHOLEST SERPL-MCNC: 231 MG/DL
CO2 SERPL-SCNC: 27 MMOL/L
CREAT SERPL-MCNC: 0.76 MG/DL
ESTIMATED AVERAGE GLUCOSE: 114 MG/DL
GLUCOSE SERPL-MCNC: 80 MG/DL
HBA1C MFR BLD HPLC: 5.6 %
HDLC SERPL-MCNC: 90 MG/DL
LDLC SERPL CALC-MCNC: 124 MG/DL
NONHDLC SERPL-MCNC: 140 MG/DL
POTASSIUM SERPL-SCNC: 4.7 MMOL/L
PROT SERPL-MCNC: 7.2 G/DL
SODIUM SERPL-SCNC: 140 MMOL/L
TRIGL SERPL-MCNC: 78 MG/DL

## 2022-02-17 ENCOUNTER — NON-APPOINTMENT (OUTPATIENT)
Age: 72
End: 2022-02-17

## 2022-02-17 ENCOUNTER — APPOINTMENT (OUTPATIENT)
Dept: OPHTHALMOLOGY | Facility: CLINIC | Age: 72
End: 2022-02-17
Payer: COMMERCIAL

## 2022-02-17 PROCEDURE — 92014 COMPRE OPH EXAM EST PT 1/>: CPT

## 2022-02-17 PROCEDURE — 92134 CPTRZ OPH DX IMG PST SGM RTA: CPT

## 2022-04-21 ENCOUNTER — APPOINTMENT (OUTPATIENT)
Dept: ORTHOPEDIC SURGERY | Facility: CLINIC | Age: 72
End: 2022-04-21
Payer: COMMERCIAL

## 2022-04-21 VITALS — WEIGHT: 150 LBS | BODY MASS INDEX: 27.6 KG/M2 | HEIGHT: 62 IN

## 2022-04-21 DIAGNOSIS — Z82.49 FAMILY HISTORY OF ISCHEMIC HEART DISEASE AND OTHER DISEASES OF THE CIRCULATORY SYSTEM: ICD-10-CM

## 2022-04-21 DIAGNOSIS — M75.41 IMPINGEMENT SYNDROME OF RIGHT SHOULDER: ICD-10-CM

## 2022-04-21 DIAGNOSIS — M18.12 UNILATERAL PRIMARY OSTEOARTHRITIS OF FIRST CARPOMETACARPAL JOINT, LEFT HAND: ICD-10-CM

## 2022-04-21 DIAGNOSIS — Z86.59 PERSONAL HISTORY OF OTHER MENTAL AND BEHAVIORAL DISORDERS: ICD-10-CM

## 2022-04-21 DIAGNOSIS — Z78.9 OTHER SPECIFIED HEALTH STATUS: ICD-10-CM

## 2022-04-21 DIAGNOSIS — Z87.39 PERSONAL HISTORY OF OTHER DISEASES OF THE MUSCULOSKELETAL SYSTEM AND CONNECTIVE TISSUE: ICD-10-CM

## 2022-04-21 DIAGNOSIS — M67.441 GANGLION, RIGHT HAND: ICD-10-CM

## 2022-04-21 DIAGNOSIS — G56.01 CARPAL TUNNEL SYNDROME, RIGHT UPPER LIMB: ICD-10-CM

## 2022-04-21 PROCEDURE — 73030 X-RAY EXAM OF SHOULDER: CPT | Mod: RT

## 2022-04-21 PROCEDURE — 99204 OFFICE O/P NEW MOD 45 MIN: CPT

## 2022-04-21 PROCEDURE — 73130 X-RAY EXAM OF HAND: CPT | Mod: LT

## 2022-04-21 RX ORDER — BETAMETHASONE DIPROPIONATE 0.5 MG/G
0.05 CREAM, AUGMENTED TOPICAL
Qty: 1 | Refills: 2 | Status: DISCONTINUED | COMMUNITY
Start: 2021-12-17 | End: 2022-04-21

## 2022-04-21 RX ORDER — TRIAMCINOLONE ACETONIDE 1 MG/G
0.1 CREAM TOPICAL TWICE DAILY
Qty: 1 | Refills: 3 | Status: DISCONTINUED | COMMUNITY
Start: 2021-12-17 | End: 2022-04-21

## 2022-04-21 NOTE — END OF VISIT
[FreeTextEntry3] : This note was written by David Fragoso on 04/21/2022 acting solely as a scribe for Dr. Gerard Patel.\par  \par All medical record entries made by the Scribe were at my, Dr. Gerard Patel, direction and personally dictated by me on 04/21/2022. I have personally reviewed the chart and agree that the record accurately reflects my personal performance of the history, physical exam.

## 2022-04-21 NOTE — DISCUSSION/SUMMARY
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 \par \par With regard to her right carpal tunnel syndrome probable left cubital tunnel syndrome, I did discuss use of carpal tunnel splints at night.  If her symptoms worsen, then I recommended follow-up for further evaluation in this regard.\par \par With regard to her left thumb pain secondary to basal joint arthritis, I discussed a cortisone injection.  She agreed that her symptoms are not significant enough at this time to warrant an injection.  She was instructed on the use of anti-inflammatories, ice and Voltaren gel.  If her symptoms worsen, then she will follow-up for a cortisone injection in the future.\par \par With regard to her right shoulder pain secondary to impingement syndrome, I recommended home exercises and Thera-Band.  She deferred referral to physical therapy.\par \par She will follow-up on an as-needed basis.\par \par The patient has agreed to the above plan of management and has expressed full understanding.  All questions were fully answered to the patient's satisfaction. \par \par My cumulative time spent on this visit included: Preparation for the visit, review of the medical records, review of pertinent diagnostic studies, examination and counseling of the patient on the above diagnosis, treatment plan and prognosis, orders of diagnostic tests, medication and/or appropriate procedures and documentation in the medical records of today's visit.

## 2022-04-21 NOTE — PHYSICAL EXAM
[de-identified] : - Constitutional: This is a female in no obvious distress.  \par - Psych: Patient is alert and oriented to person, place and time.  Patient has a normal mood and affect.\par - Cardiovascular: Normal pulses throughout the upper extremities.  No significant varicosities are noted in the upper extremities. \par - Neuro: Strength and sensation are intact throughout the upper extremities.  Patient has normal coordination.\par - Respiratory:  Patient exhibits no evidence of shortness of breath or difficulty breathing.\par - Skin: No rashes, lesions, or other abnormalities are noted in the upper extremities.\par \par ---\par \par Examination of her left hand demonstrates swelling and tenderness along the CMC joint of the thumb.  She has findings consistent with CMC joint arthritis.  There is no crepitus or instability.  There is no evidence of de Quervain's tendinitis on either side or trigger finger.  She does have a small cyst along the right ring finger PIP joint dorsally and radially.  She has full flexion extension of the digits.  Provocative signs for carpal and cubital tunnel syndrome are negative bilaterally.  She has normal strength and sensation distally bilaterally along the radial, ulnar and median nerve distributions. [de-identified] : AP, lateral, and oblique radiographs of the bilateral hand demonstrate mild to moderate degenerative changes at the CMC joints of the thumbs as well as mild to moderate degenerative changes at the DIP joints of the digits, most notably at the middle fingers.\par \par AP and Y radiographs of her right shoulder demonstrate mild AC joint arthritis.

## 2022-04-21 NOTE — ADDENDUM
[FreeTextEntry1] : I, David Fragoso, acted solely as a scribe for Dr. Patel on this date on 04/21/2022.

## 2022-04-21 NOTE — HISTORY OF PRESENT ILLNESS
[Right] : right hand dominant [FreeTextEntry1] : She comes in today for evaluation of bilateral hand pain, which began several years ago. She states that she has pain all over her body, but she would like to get her hands and shoulders evaluated. She states that she would like to get recommendation to other orthopedic doctors. She notes pain in her left hand  radiates up to her elbow. She states that the pain in the right hand is mostly in the fingers. She also complains of bilateral shoulder pain, which began several years ago. She states that the pain is muscular. She rates her pain a 5 out of 10 at this time.

## 2022-08-18 ENCOUNTER — NON-APPOINTMENT (OUTPATIENT)
Age: 72
End: 2022-08-18

## 2022-08-18 ENCOUNTER — APPOINTMENT (OUTPATIENT)
Dept: OPHTHALMOLOGY | Facility: CLINIC | Age: 72
End: 2022-08-18

## 2022-08-18 PROCEDURE — 92014 COMPRE OPH EXAM EST PT 1/>: CPT

## 2022-08-18 PROCEDURE — 92134 CPTRZ OPH DX IMG PST SGM RTA: CPT

## 2022-11-03 ENCOUNTER — APPOINTMENT (OUTPATIENT)
Dept: NEUROSURGERY | Facility: CLINIC | Age: 72
End: 2022-11-03
Payer: COMMERCIAL

## 2022-11-03 VITALS
DIASTOLIC BLOOD PRESSURE: 82 MMHG | WEIGHT: 150 LBS | HEART RATE: 73 BPM | TEMPERATURE: 98.2 F | SYSTOLIC BLOOD PRESSURE: 128 MMHG | BODY MASS INDEX: 27.6 KG/M2 | HEIGHT: 62 IN

## 2022-11-03 PROCEDURE — 99204 OFFICE O/P NEW MOD 45 MIN: CPT

## 2022-11-03 PROCEDURE — 99214 OFFICE O/P EST MOD 30 MIN: CPT

## 2022-11-18 ENCOUNTER — APPOINTMENT (OUTPATIENT)
Dept: NEUROSURGERY | Facility: CLINIC | Age: 72
End: 2022-11-18

## 2022-11-18 VITALS
WEIGHT: 150 LBS | HEIGHT: 62 IN | TEMPERATURE: 97.9 F | SYSTOLIC BLOOD PRESSURE: 108 MMHG | HEART RATE: 98 BPM | DIASTOLIC BLOOD PRESSURE: 74 MMHG | BODY MASS INDEX: 27.6 KG/M2

## 2022-11-18 PROCEDURE — 99213 OFFICE O/P EST LOW 20 MIN: CPT

## 2022-11-18 NOTE — HISTORY OF PRESENT ILLNESS
[FreeTextEntry1] : Neck and bilateral shoulder pain [de-identified] : This is a 72-year-old female that was seen previously by our practice with complaints of neck pain.  She went to pain management at that point and received ablation procedures and did quite well from it.  The pain has now returned and she has pain rating to bilateral shoulders.  She denies any new numbness tingling weakness of bladder bowel dysfunction pain is constant and severe.  She denies recent physical therapy.  She has taken anti-inflammatories in the past.  She states the pain is severe constant without relieving factors\par \par returns today in brief follow up to review mri

## 2022-11-18 NOTE — REASON FOR VISIT
[New Patient Visit] : a new patient visit [FreeTextEntry1] : PT is her with complaints of neck pain.

## 2022-11-18 NOTE — RESULTS/DATA
[FreeTextEntry1] : MRI cervical spine shows multilevel degenerative disc disease no spinal cord compression bilateral foraminal stenosis and spondylitic changes

## 2022-11-18 NOTE — PLAN
[FreeTextEntry1] : 72-year-old female with recurrent neck and some bilateral shoulder pain.  At this time I do believe the best course of action for her would be for the patient to return to pain management since she did so well with in the past.  no neurosurgical intervention recommended

## 2022-12-19 ENCOUNTER — APPOINTMENT (OUTPATIENT)
Dept: FAMILY MEDICINE | Facility: CLINIC | Age: 72
End: 2022-12-19

## 2022-12-19 VITALS
HEIGHT: 62 IN | TEMPERATURE: 96.3 F | RESPIRATION RATE: 16 BRPM | DIASTOLIC BLOOD PRESSURE: 70 MMHG | OXYGEN SATURATION: 96 % | WEIGHT: 157 LBS | SYSTOLIC BLOOD PRESSURE: 110 MMHG | HEART RATE: 92 BPM | BODY MASS INDEX: 28.89 KG/M2

## 2022-12-19 DIAGNOSIS — E55.9 VITAMIN D DEFICIENCY, UNSPECIFIED: ICD-10-CM

## 2022-12-19 PROCEDURE — 99397 PER PM REEVAL EST PAT 65+ YR: CPT

## 2022-12-19 NOTE — HEALTH RISK ASSESSMENT
[Very Good] : ~his/her~  mood as very good [Never] : Never [No falls in past year] : Patient reported no falls in the past year [Patient reported mammogram was normal] : Patient reported mammogram was normal [Patient reported colonoscopy was normal] : Patient reported colonoscopy was normal [Change in mental status noted] : No change in mental status noted [Language] : denies difficulty with language [Behavior] : denies difficulty with behavior [Learning/Retaining New Information] : denies difficulty learning/retaining new information [Handling Complex Tasks] : denies difficulty handling complex tasks [Reasoning] : denies difficulty with reasoning [Spatial Ability and Orientation] : denies difficulty with spatial ability and orientation [None] : None [Fully functional (bathing, dressing, toileting, transferring, walking, feeding)] : Fully functional (bathing, dressing, toileting, transferring, walking, feeding) [Fully functional (using the telephone, shopping, preparing meals, housekeeping, doing laundry, using] : Fully functional and needs no help or supervision to perform IADLs (using the telephone, shopping, preparing meals, housekeeping, doing laundry, using transportation, managing medications and managing finances) [Reports changes in hearing] : Reports no changes in hearing [Reports changes in vision] : Reports no changes in vision [Reports changes in dental health] : Reports no changes in dental health

## 2022-12-19 NOTE — HISTORY OF PRESENT ILLNESS
[FreeTextEntry1] : annual [de-identified] : annual well acitve healthy diet limited exercise mammo and colonoscopy utd sees cardioology placed on statin 2 months ago history of anxiety controlled on current medications

## 2022-12-20 LAB
25(OH)D3 SERPL-MCNC: 66 NG/ML
ALBUMIN SERPL ELPH-MCNC: 4.6 G/DL
ALP BLD-CCNC: 124 U/L
ALT SERPL-CCNC: 18 U/L
ANION GAP SERPL CALC-SCNC: 9 MMOL/L
AST SERPL-CCNC: 22 U/L
BASOPHILS # BLD AUTO: 0.04 K/UL
BASOPHILS NFR BLD AUTO: 0.7 %
BILIRUB SERPL-MCNC: 0.4 MG/DL
BUN SERPL-MCNC: 14 MG/DL
CALCIUM SERPL-MCNC: 10.3 MG/DL
CHLORIDE SERPL-SCNC: 102 MMOL/L
CHOLEST SERPL-MCNC: 191 MG/DL
CO2 SERPL-SCNC: 29 MMOL/L
CREAT SERPL-MCNC: 0.81 MG/DL
EGFR: 78 ML/MIN/1.73M2
EOSINOPHIL # BLD AUTO: 0.09 K/UL
EOSINOPHIL NFR BLD AUTO: 1.5 %
GLUCOSE SERPL-MCNC: 91 MG/DL
HCT VFR BLD CALC: 44 %
HDLC SERPL-MCNC: 93 MG/DL
HGB BLD-MCNC: 13.9 G/DL
IMM GRANULOCYTES NFR BLD AUTO: 0.2 %
LDLC SERPL CALC-MCNC: 82 MG/DL
LYMPHOCYTES # BLD AUTO: 1.19 K/UL
LYMPHOCYTES NFR BLD AUTO: 19.6 %
MAN DIFF?: NORMAL
MCHC RBC-ENTMCNC: 29.8 PG
MCHC RBC-ENTMCNC: 31.6 GM/DL
MCV RBC AUTO: 94.2 FL
MONOCYTES # BLD AUTO: 0.41 K/UL
MONOCYTES NFR BLD AUTO: 6.8 %
NEUTROPHILS # BLD AUTO: 4.33 K/UL
NEUTROPHILS NFR BLD AUTO: 71.2 %
NONHDLC SERPL-MCNC: 98 MG/DL
PLATELET # BLD AUTO: 306 K/UL
POTASSIUM SERPL-SCNC: 5.3 MMOL/L
PROT SERPL-MCNC: 7.1 G/DL
RBC # BLD: 4.67 M/UL
RBC # FLD: 13.2 %
SODIUM SERPL-SCNC: 141 MMOL/L
T4 SERPL-MCNC: 5.7 UG/DL
TRIGL SERPL-MCNC: 80 MG/DL
TSH SERPL-ACNC: 1.37 UIU/ML
URATE SERPL-MCNC: 3.9 MG/DL
VIT B12 SERPL-MCNC: 490 PG/ML
WBC # FLD AUTO: 6.07 K/UL

## 2022-12-27 LAB — ANA SER IF-ACNC: NEGATIVE

## 2023-02-16 ENCOUNTER — APPOINTMENT (OUTPATIENT)
Dept: OPHTHALMOLOGY | Facility: CLINIC | Age: 73
End: 2023-02-16
Payer: COMMERCIAL

## 2023-02-16 ENCOUNTER — NON-APPOINTMENT (OUTPATIENT)
Age: 73
End: 2023-02-16

## 2023-02-16 PROCEDURE — 92134 CPTRZ OPH DX IMG PST SGM RTA: CPT

## 2023-02-16 PROCEDURE — 92014 COMPRE OPH EXAM EST PT 1/>: CPT

## 2023-08-24 ENCOUNTER — APPOINTMENT (OUTPATIENT)
Dept: OPHTHALMOLOGY | Facility: CLINIC | Age: 73
End: 2023-08-24
Payer: COMMERCIAL

## 2023-08-24 ENCOUNTER — NON-APPOINTMENT (OUTPATIENT)
Age: 73
End: 2023-08-24

## 2023-08-24 PROCEDURE — 92134 CPTRZ OPH DX IMG PST SGM RTA: CPT

## 2023-08-24 PROCEDURE — 92014 COMPRE OPH EXAM EST PT 1/>: CPT

## 2023-10-13 ENCOUNTER — APPOINTMENT (OUTPATIENT)
Dept: FAMILY MEDICINE | Facility: CLINIC | Age: 73
End: 2023-10-13
Payer: COMMERCIAL

## 2023-10-13 VITALS
HEART RATE: 71 BPM | WEIGHT: 153 LBS | TEMPERATURE: 97.6 F | SYSTOLIC BLOOD PRESSURE: 112 MMHG | BODY MASS INDEX: 28.16 KG/M2 | OXYGEN SATURATION: 98 % | DIASTOLIC BLOOD PRESSURE: 70 MMHG | HEIGHT: 62 IN

## 2023-10-13 DIAGNOSIS — G47.00 INSOMNIA, UNSPECIFIED: ICD-10-CM

## 2023-10-13 PROCEDURE — 99213 OFFICE O/P EST LOW 20 MIN: CPT

## 2023-10-13 RX ORDER — IBUPROFEN 600 MG/1
600 TABLET, FILM COATED ORAL 3 TIMES DAILY
Qty: 60 | Refills: 1 | Status: ACTIVE | COMMUNITY
Start: 2019-04-01 | End: 1900-01-01

## 2024-01-04 ENCOUNTER — APPOINTMENT (OUTPATIENT)
Dept: FAMILY MEDICINE | Facility: CLINIC | Age: 74
End: 2024-01-04
Payer: COMMERCIAL

## 2024-01-04 VITALS
DIASTOLIC BLOOD PRESSURE: 70 MMHG | HEART RATE: 72 BPM | SYSTOLIC BLOOD PRESSURE: 110 MMHG | HEIGHT: 62 IN | OXYGEN SATURATION: 98 % | TEMPERATURE: 97.3 F | BODY MASS INDEX: 28.34 KG/M2 | WEIGHT: 154 LBS

## 2024-01-04 DIAGNOSIS — Z00.00 ENCOUNTER FOR GENERAL ADULT MEDICAL EXAMINATION W/OUT ABNORMAL FINDINGS: ICD-10-CM

## 2024-01-04 DIAGNOSIS — F41.9 ANXIETY DISORDER, UNSPECIFIED: ICD-10-CM

## 2024-01-04 DIAGNOSIS — M54.12 RADICULOPATHY, CERVICAL REGION: ICD-10-CM

## 2024-01-04 DIAGNOSIS — I08.0 RHEUMATIC DISORDERS OF BOTH MITRAL AND AORTIC VALVES: ICD-10-CM

## 2024-01-04 DIAGNOSIS — Z91.89 OTHER SPECIFIED PERSONAL RISK FACTORS, NOT ELSEWHERE CLASSIFIED: ICD-10-CM

## 2024-01-04 DIAGNOSIS — F32.A DEPRESSION, UNSPECIFIED: ICD-10-CM

## 2024-01-04 PROCEDURE — G0009: CPT

## 2024-01-04 PROCEDURE — 99214 OFFICE O/P EST MOD 30 MIN: CPT

## 2024-01-04 PROCEDURE — 90671 PCV15 VACCINE IM: CPT

## 2024-01-04 RX ORDER — CELECOXIB 200 MG/1
200 CAPSULE ORAL
Qty: 30 | Refills: 5 | Status: DISCONTINUED | COMMUNITY
Start: 2022-11-03 | End: 2024-01-04

## 2024-01-04 RX ORDER — ROSUVASTATIN CALCIUM 5 MG/1
5 TABLET, FILM COATED ORAL
Refills: 0 | Status: ACTIVE | COMMUNITY

## 2024-01-04 NOTE — ASSESSMENT
[FreeTextEntry1] : Patient doing well continue current meds PCV 15 given to complete pneumo series patient refuses flu shot

## 2024-01-04 NOTE — HISTORY OF PRESENT ILLNESS
[de-identified] : Torticollis cervical radiculopathy has been improving she attributes this to getting a better night sleep by taking lorazepam once a day at bedtime  Depression is well-controlled on paroxetine she would like to continue it.  She has still not made peace with her daughter  Cardiologist has prescribed 5 mg of rosuvastatin for primary prevention coronary calcium score of 2  Patient has follow-up colonoscopy scheduled for this month continues to have rare episodes of fecal incontinence.  Problem has improved significantly with the addition of fiber

## 2024-01-04 NOTE — PHYSICAL EXAM
[Thyroid Normal, No Nodules] : the thyroid was normal and there were no nodules present [Normal] : normal rate, regular rhythm, normal S1 and S2 and no murmur heard [No Edema] : there was no peripheral edema

## 2024-01-05 ENCOUNTER — TRANSCRIPTION ENCOUNTER (OUTPATIENT)
Age: 74
End: 2024-01-05

## 2024-01-05 LAB
ALBUMIN SERPL ELPH-MCNC: 4.5 G/DL
ALP BLD-CCNC: 121 U/L
ALT SERPL-CCNC: 22 U/L
ANION GAP SERPL CALC-SCNC: 10 MMOL/L
AST SERPL-CCNC: 25 U/L
BASOPHILS # BLD AUTO: 0.04 K/UL
BASOPHILS NFR BLD AUTO: 0.6 %
BILIRUB SERPL-MCNC: 0.3 MG/DL
BUN SERPL-MCNC: 13 MG/DL
CALCIUM SERPL-MCNC: 10.4 MG/DL
CHLORIDE SERPL-SCNC: 103 MMOL/L
CHOLEST SERPL-MCNC: 165 MG/DL
CO2 SERPL-SCNC: 28 MMOL/L
CREAT SERPL-MCNC: 0.74 MG/DL
EGFR: 86 ML/MIN/1.73M2
EOSINOPHIL # BLD AUTO: 0.09 K/UL
EOSINOPHIL NFR BLD AUTO: 1.3 %
ESTIMATED AVERAGE GLUCOSE: 117 MG/DL
GLUCOSE SERPL-MCNC: 83 MG/DL
HBA1C MFR BLD HPLC: 5.7 %
HCT VFR BLD CALC: 43.5 %
HDLC SERPL-MCNC: 87 MG/DL
HGB BLD-MCNC: 13.7 G/DL
IMM GRANULOCYTES NFR BLD AUTO: 0.3 %
LDLC SERPL CALC-MCNC: 64 MG/DL
LYMPHOCYTES # BLD AUTO: 1.53 K/UL
LYMPHOCYTES NFR BLD AUTO: 21.3 %
MAN DIFF?: NORMAL
MCHC RBC-ENTMCNC: 29.7 PG
MCHC RBC-ENTMCNC: 31.5 GM/DL
MCV RBC AUTO: 94.2 FL
MONOCYTES # BLD AUTO: 0.45 K/UL
MONOCYTES NFR BLD AUTO: 6.3 %
NEUTROPHILS # BLD AUTO: 5.07 K/UL
NEUTROPHILS NFR BLD AUTO: 70.2 %
NONHDLC SERPL-MCNC: 78 MG/DL
PLATELET # BLD AUTO: 312 K/UL
POTASSIUM SERPL-SCNC: 5.1 MMOL/L
PROT SERPL-MCNC: 7 G/DL
RBC # BLD: 4.62 M/UL
RBC # FLD: 13 %
SODIUM SERPL-SCNC: 141 MMOL/L
TRIGL SERPL-MCNC: 77 MG/DL
WBC # FLD AUTO: 7.2 K/UL

## 2024-02-22 ENCOUNTER — APPOINTMENT (OUTPATIENT)
Dept: OPHTHALMOLOGY | Facility: CLINIC | Age: 74
End: 2024-02-22
Payer: COMMERCIAL

## 2024-02-22 ENCOUNTER — NON-APPOINTMENT (OUTPATIENT)
Age: 74
End: 2024-02-22

## 2024-02-22 PROCEDURE — 92134 CPTRZ OPH DX IMG PST SGM RTA: CPT

## 2024-02-22 PROCEDURE — 92014 COMPRE OPH EXAM EST PT 1/>: CPT

## 2024-02-29 ENCOUNTER — NON-APPOINTMENT (OUTPATIENT)
Age: 74
End: 2024-02-29

## 2024-02-29 ENCOUNTER — APPOINTMENT (OUTPATIENT)
Dept: OPHTHALMOLOGY | Facility: CLINIC | Age: 74
End: 2024-02-29
Payer: COMMERCIAL

## 2024-02-29 PROCEDURE — 66821 AFTER CATARACT LASER SURGERY: CPT | Mod: LT

## 2024-03-25 ENCOUNTER — LABORATORY RESULT (OUTPATIENT)
Age: 74
End: 2024-03-25

## 2024-03-25 ENCOUNTER — APPOINTMENT (OUTPATIENT)
Dept: COLORECTAL SURGERY | Facility: CLINIC | Age: 74
End: 2024-03-25
Payer: COMMERCIAL

## 2024-03-25 VITALS
HEIGHT: 62 IN | WEIGHT: 150 LBS | OXYGEN SATURATION: 96 % | RESPIRATION RATE: 14 BRPM | SYSTOLIC BLOOD PRESSURE: 114 MMHG | BODY MASS INDEX: 27.6 KG/M2 | TEMPERATURE: 96.8 F | HEART RATE: 81 BPM | DIASTOLIC BLOOD PRESSURE: 65 MMHG

## 2024-03-25 DIAGNOSIS — R15.9 FULL INCONTINENCE OF FECES: ICD-10-CM

## 2024-03-25 PROCEDURE — 99203 OFFICE O/P NEW LOW 30 MIN: CPT | Mod: 25

## 2024-03-25 PROCEDURE — 46606 ANOSCOPY AND BIOPSY: CPT

## 2024-03-25 NOTE — PLAN
[TextEntry] : 73-year-old female with fecal incontinence and a lax sphincter muscle.  I recommend fiber to bulk up her stools as well as pelvic floor physical therapy.  I discussed the role of sacral nerve stimulator to help with incontinence.  She will follow-up in 4 to 6 weeks to see if there is any improvement with nonoperative approaches and consider SNS

## 2024-03-25 NOTE — PROCEDURE
[FreeTextEntry1] : Anoscopy showed anterior anal scar from prior surgery with a 5 mm polypoid lesion that was removed

## 2024-03-25 NOTE — PHYSICAL EXAM
[Excoriation] : no perianal excoriation [Lax] : was lax [None] : there was no rectal abscess [Gross Blood] : no gross blood [Respiratory Effort] : normal respiratory effort [Normal Rate and Rhythm] : normal rate and rhythm [Calm] : calm [de-identified] : soft, non tender, non distended. No mass or hernias appreciated. Lower midline incision noted [de-identified] : grade 1 internal hemorrhoids [de-identified] : well appearing, in no distress [de-identified] : moves extremities without difficulty [de-identified] : normocephalic, atraumatic, [de-identified] : alert and oriented x 3 [de-identified] : warm and dry

## 2024-03-25 NOTE — CONSULT LETTER
[Dear  ___] : Dear  [unfilled], [Please see my note below.] : Please see my note below. [Consult Letter:] : I had the pleasure of evaluating your patient, [unfilled]. [Consult Closing:] : Thank you very much for allowing me to participate in the care of this patient.  If you have any questions, please do not hesitate to contact me. [FreeTextEntry3] : Kaiden Dinh MD [Sincerely,] : Sincerely, [DrJadon  ___] : Dr. CALDERÓN

## 2024-03-25 NOTE — HISTORY OF PRESENT ILLNESS
[FreeTextEntry1] : 73-year-old female who presents for consultation for fecal incontinence.  She describes a 5-year history of that has progressively worsened.  In the last year, she has had 5 episodes of overt fecal incontinence all associated with loose stools. In addition, she has fecal smearing of formed pasty stool at least on a weekly basis as well as incontinence of flatus.  She occasionally has a sensation of incomplete evacuation.  She denies rectal prolapse or any bulge in the area.  No urinary symptoms.  She has had several colonoscopies and sigmoidoscopy is given the history of a villous adenoma that required transanal resection in 2005 and 2007 by Dr. Pacheco

## 2024-03-28 ENCOUNTER — NON-APPOINTMENT (OUTPATIENT)
Age: 74
End: 2024-03-28

## 2024-03-28 ENCOUNTER — APPOINTMENT (OUTPATIENT)
Dept: OPHTHALMOLOGY | Facility: CLINIC | Age: 74
End: 2024-03-28
Payer: COMMERCIAL

## 2024-03-28 PROCEDURE — 92015 DETERMINE REFRACTIVE STATE: CPT

## 2024-03-28 PROCEDURE — 99024 POSTOP FOLLOW-UP VISIT: CPT

## 2024-03-29 ENCOUNTER — NON-APPOINTMENT (OUTPATIENT)
Age: 74
End: 2024-03-29

## 2024-04-11 NOTE — DATA REVIEWED
4/11/2024         RE: Ramila Liao  [address]  [facility] [address]        Dear Colleague,    Thank you for referring your patient, Ramila Liao, to the [facility]. Please see a copy of my visit note below.      Assessment:   Ramila Liao is a 87 year old y.o. female with past medical history significant for  CVA, hyperlipidemia who presents today for follow-up regarding a 6 to 8-week history of severe pain radiating into the right lower extremity with associated weakness.  Pain follows a right L3 nerve root pattern.  She has severe weakness in the right hip flexors.  She has an abnormal gait with significant hesitancy with forward movement of the right leg.  This could be due to right hip flexor weakness secondary to right L3 nerve root impingement, however, if her MRI lumbar spine does not show right L3 nerve root impingement I will recommend a referral to neurology to see if there is some other neurologic process causing the weakness/abnormal gait.             Plan:     A shared decision making plan was used.  The patient's values and choices were respected.  The following represents what was discussed and decided upon by the physician assistant and the patient.      1.  DIAGNOSTIC TESTS:    -I reviewed the x-ray right hip from May 2023.  - I reviewed the MRI lumbar spine from March 2023.  - I reviewed the MRI thoracic spine from November 2023.  - I reviewed the x-rays of the lumbar spine and pelvis/right hip January 2024.  - I ordered an updated MRI lumbar spine for further evaluation.  Specifically I am looking for right L3 nerve root impingement.  She has severe weakness in the right hip flexors on exam.    2.  PHYSICAL THERAPY:  - Patient recently completed physical therapy at her living facility.  No additional physical therapy was ordered.    3.  MEDICATIONS: No changes made to the patient's medications.  I will defer to her nursing home doctor  [FreeTextEntry1] : Cholesterol profile excellent for pain management.  I am concerned about her fall risk.  - Patient takes gabapentin 300 mg in the morning and 600 mg at bedtime.  - Patient has been applying topical over-the-counter pain relieving patches which have been helpful.  - Patient takes Tylenol as needed which is somewhat helpful.  - Patient takes tizanidine as needed.  - Patient takes tramadol as needed this is somewhat helpful.  - I would not recommend NSAIDs which could worsen epigastric pain and due to comorbidities including stroke      4.  INTERVENTIONS: No interventions were ordered.  Will await the results of her MRI.  If this shows right L3 nerve root impingement we could consider an epidural steroid injection.      5.    PATIENT EDUCATION: Patient is in agreement the above plan.  All questions were answered.  - If the MRI lumbar spine does not show right L3 nerve root impingement I recommend a referral to neurology and also orthopedics for further evaluation.    6.  FOLLOW-UP: My nurse will call the patient with the results of her MRI lumbar spine.  She has questions or concerns in the meantime, she should not hesitate to call.      Subjective:     Ramila Liao is a 87 year old female who presents today for follow-up regarding a 6 to 8-week history of severe pain radiating into the right lower extremity with associated weakness.  Patient denies any trauma or event to cause the pain.  Pain is significantly impacting her function.  She is having difficulty walking because of the pain.  She is also having difficulty sleeping.  She states that she only sleeps for 1 to 2 hours before the pain wakes her up.  She has to sleep in a recliner chair which exacerbates the swelling in her legs.     Patient complains of pain radiating to the right lower extremity.  She states the pain wraps around from the right lateral hip into the anterior/medial thigh.  Pain does not extend past the knee.  Over the past couple of days she has had some lateral thigh  pain as well but that is new and not nearly as severe as anterior/medial thigh pain.  She also has pain in the right low back.  She states her leg pain is worse than her back pain.  Patient reports some pain in the right groin when she wears tight underwear but states that is more like irritation of the skin.  She feels a tingling sensation in the same distribution as her pain.  She states that her right leg feels very weak.  She has a limp with her hands.  She has difficulty walking because of the weakness.  She rates her pain today as a 7 out of 10.  At its worst it is a 9 out of 10.  At its best it is a 5 out of 10.  Pain is aggravated try to sleep at night.  Pain is alleviated somewhat with walking.        Treatment to date:  - Current physical therapy at her living facility  -Patient previously went to physical therapy at [facility].  - 8 sessions physical therapy through [facility] [id number].  - Right trochanteric bursa injection January 29, 2024 with relief of right lateral hip pain  - T10-T11 interlaminar epidural steroid injection December 6, 2023 with 80 to 90% relief of pain  - right piriformis muscle trigger point injection January 2, 2023 which did not provide any relief of her pain.  -right L4-5 and L5-S1 transforaminal epidural steroid injections December 13, 2022 did not not provide any relief of her pain.  - right L5-S1 transforaminal epidural steroid injection November 10, 2022 which provided 60 to 70% relief of his pain but relief was already waning by 4 weeks after the procedure.  - Gabapentin  - Tylenol as needed  - Aspirin  - Tramadol  - Tizanidine    Review of Systems:  Positive for weakness, loss of bladder control, pain much worse at night.  Negative for numbness/tingling, loss of bowel control, inability to urinate, headache, trip/stumble/falls, difficulty swallowing, difficulty with hand skills, fevers, unintentional weight loss.     Objective:   CONSTITUTIONAL:  Vital signs as above.   No acute distress.  The patient is well nourished and well groomed.    PSYCHIATRIC:  The patient is awake, alert, oriented to person, place and time.  The patient is answering questions appropriately with clear speech.  Normal affect.  HEENT: Normocephalic, atraumatic.  Sclera clear.    SKIN: Clean, dry, intact.  MUSCULOSKELETAL: Patient ambulates with a shuffling gait.  She has significant hesitancy with initiation of the gait with the right leg.  She walks with a 4 wheeled walker.  Tender to palpation right lower lumbar paraspinous muscles.  2/5 strength right hip flexors, otherwise 5/5 strength left hip flexors, bilateral knee flexors/extensors, bilateral ankle dorsi/plantar flexors.  NEUROLOGICAL: Subjective altered sensation right anteromedial thigh.  1-2+ bilateral patellar reflexes.     RESULTS:   I reviewed the x-ray lumbar spine from [facility] dated January 11, 2024.  This shows 1 cm anterolisthesis L5-S1.  No evidence for fracture.  Multilevel degenerative disc disease.    Reviewed the x-ray right hip from [facility] dated January 11, 2024.  This shows no fracture.  Very mild degenerative change of both hips.    I reviewed the MRI thoracic spine from [facility] dated November 13, 2023.  At T9-T10 there is a calcified central/left paracentral chronic disc herniation resulting in contact and subtle flattening of the anterior/left aspect of the thoracic cord.  At T7-T8 there is a shallow central/left paracentral ossified disc bulge with mild attenuation of the anterior subarachnoid space but no evidence of critical spinal canal stenosis.    I reviewed the MRI lumbar spine from [facility] dated March 27, 2023.  This shows anterolisthesis L5 on S1 with moderate to severe right and mild to moderate left foraminal stenosis.  There is also mild spinal canal stenosis L3-4, L4-5.    CT abdomen pelvis October 24, 2023:  IMPRESSION:  1.  No acute pulmonary embolism.  2.  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  3.  No definitive acute CT abnormality of the abdomen/pelvis. Possible constipation.  4.  Unchanged moderate to severe intrahepatic and extrahepatic biliary ductal dilatation, some of which may be attributable to the postcholecystectomy state. However, correlation with LFTs and MRCP follow-up is recommended.  5.  Hepatic steatosis.  6.  Unchanged, eccentrically calcified 6 mm nodule within the left upper lobe. A follow-up chest CT is recommended in 6-12 months to document continued stability of this finding.      Again, thank you for allowing me to participate in the care of your patient.        Sincerely,        Tiffanie Quan PA-C

## 2024-04-16 ENCOUNTER — NON-APPOINTMENT (OUTPATIENT)
Age: 74
End: 2024-04-16

## 2024-05-10 ENCOUNTER — APPOINTMENT (OUTPATIENT)
Dept: COLORECTAL SURGERY | Facility: CLINIC | Age: 74
End: 2024-05-10

## 2024-07-25 ENCOUNTER — NON-APPOINTMENT (OUTPATIENT)
Age: 74
End: 2024-07-25

## 2024-07-25 ENCOUNTER — APPOINTMENT (OUTPATIENT)
Dept: OPHTHALMOLOGY | Facility: CLINIC | Age: 74
End: 2024-07-25
Payer: COMMERCIAL

## 2024-07-25 PROCEDURE — 99213 OFFICE O/P EST LOW 20 MIN: CPT

## 2024-08-02 ENCOUNTER — APPOINTMENT (OUTPATIENT)
Dept: ORTHOPEDIC SURGERY | Facility: CLINIC | Age: 74
End: 2024-08-02

## 2024-08-08 ENCOUNTER — APPOINTMENT (OUTPATIENT)
Dept: FAMILY MEDICINE | Facility: CLINIC | Age: 74
End: 2024-08-08

## 2024-08-08 PROBLEM — B02.9 HERPES ZOSTER: Status: ACTIVE | Noted: 2024-08-08

## 2024-08-08 PROCEDURE — 99214 OFFICE O/P EST MOD 30 MIN: CPT

## 2024-08-08 NOTE — HISTORY OF PRESENT ILLNESS
[FreeTextEntry8] : Shingles right S1 distribution patient just finished a course of valacyclovir 1000 3 times daily having postherpetic neuralgia pain  Physical exam erythematous skin right upper thigh and vulva.  Not infected.  Recommend regular washing with soap may use bacitracin as well  Gabapentin 100-400 every 6 hours prescribed May use Advil and Tylenol as well  Fecal incontinence recurs every 1 to 2 months.  Stool is diarrhea.  She has been evaluated by colorectal surgery.  Incontinence is infrequent and does not warrant implantable stimulator using fiber supplements intermittently doing pelvic floor exercises symptoms more consistent with irritable bowel syndrome recommend IBgard

## 2024-08-23 DIAGNOSIS — B02.29 OTHER POSTHERPETIC NERVOUS SYSTEM INVOLVEMENT: ICD-10-CM

## 2024-08-23 RX ORDER — GABAPENTIN 300 MG/1
300 CAPSULE ORAL
Qty: 120 | Refills: 1 | Status: ACTIVE | COMMUNITY
Start: 2024-08-23 | End: 1900-01-01

## 2024-08-23 RX ORDER — CAPSAICIN 0.75 MG/G
0.1 CREAM TOPICAL 4 TIMES DAILY
Qty: 1 | Refills: 3 | Status: ACTIVE | COMMUNITY
Start: 2024-08-23 | End: 1900-01-01

## 2024-09-26 ENCOUNTER — NON-APPOINTMENT (OUTPATIENT)
Age: 74
End: 2024-09-26

## 2024-09-26 ENCOUNTER — APPOINTMENT (OUTPATIENT)
Dept: OPHTHALMOLOGY | Facility: CLINIC | Age: 74
End: 2024-09-26
Payer: COMMERCIAL

## 2024-09-26 PROCEDURE — 92134 CPTRZ OPH DX IMG PST SGM RTA: CPT

## 2024-09-26 PROCEDURE — 92014 COMPRE OPH EXAM EST PT 1/>: CPT

## 2025-01-13 ENCOUNTER — APPOINTMENT (OUTPATIENT)
Dept: NEUROSURGERY | Facility: CLINIC | Age: 75
End: 2025-01-13

## 2025-01-27 ENCOUNTER — APPOINTMENT (OUTPATIENT)
Dept: COLORECTAL SURGERY | Facility: CLINIC | Age: 75
End: 2025-01-27
Payer: COMMERCIAL

## 2025-01-27 VITALS
BODY MASS INDEX: 27.6 KG/M2 | WEIGHT: 150 LBS | TEMPERATURE: 97.7 F | DIASTOLIC BLOOD PRESSURE: 74 MMHG | OXYGEN SATURATION: 97 % | RESPIRATION RATE: 14 BRPM | HEART RATE: 71 BPM | HEIGHT: 62 IN | SYSTOLIC BLOOD PRESSURE: 127 MMHG

## 2025-01-27 DIAGNOSIS — R15.9 FULL INCONTINENCE OF FECES: ICD-10-CM

## 2025-01-27 PROCEDURE — 99214 OFFICE O/P EST MOD 30 MIN: CPT | Mod: 25

## 2025-01-27 PROCEDURE — 46600 DIAGNOSTIC ANOSCOPY SPX: CPT

## 2025-03-19 ENCOUNTER — APPOINTMENT (OUTPATIENT)
Dept: OPHTHALMOLOGY | Facility: CLINIC | Age: 75
End: 2025-03-19
Payer: COMMERCIAL

## 2025-03-19 ENCOUNTER — NON-APPOINTMENT (OUTPATIENT)
Age: 75
End: 2025-03-19

## 2025-03-19 PROCEDURE — 92012 INTRM OPH EXAM EST PATIENT: CPT

## 2025-03-27 ENCOUNTER — APPOINTMENT (OUTPATIENT)
Dept: FAMILY MEDICINE | Facility: CLINIC | Age: 75
End: 2025-03-27

## 2025-05-14 ENCOUNTER — TRANSCRIPTION ENCOUNTER (OUTPATIENT)
Age: 75
End: 2025-05-14

## 2025-05-21 ENCOUNTER — NON-APPOINTMENT (OUTPATIENT)
Age: 75
End: 2025-05-21

## 2025-05-23 ENCOUNTER — NON-APPOINTMENT (OUTPATIENT)
Age: 75
End: 2025-05-23

## 2025-05-23 ENCOUNTER — APPOINTMENT (OUTPATIENT)
Dept: OPHTHALMOLOGY | Facility: CLINIC | Age: 75
End: 2025-05-23
Payer: COMMERCIAL

## 2025-05-23 PROCEDURE — 66821 AFTER CATARACT LASER SURGERY: CPT | Mod: RT

## 2025-05-23 PROCEDURE — 92134 CPTRZ OPH DX IMG PST SGM RTA: CPT

## 2025-05-23 PROCEDURE — 92014 COMPRE OPH EXAM EST PT 1/>: CPT | Mod: 25

## 2025-06-11 ENCOUNTER — TRANSCRIPTION ENCOUNTER (OUTPATIENT)
Age: 75
End: 2025-06-11

## 2025-06-27 ENCOUNTER — NON-APPOINTMENT (OUTPATIENT)
Age: 75
End: 2025-06-27

## 2025-06-27 ENCOUNTER — APPOINTMENT (OUTPATIENT)
Dept: OPHTHALMOLOGY | Facility: CLINIC | Age: 75
End: 2025-06-27
Payer: COMMERCIAL

## 2025-06-27 PROCEDURE — 99024 POSTOP FOLLOW-UP VISIT: CPT

## 2025-07-09 ENCOUNTER — TRANSCRIPTION ENCOUNTER (OUTPATIENT)
Age: 75
End: 2025-07-09